# Patient Record
Sex: FEMALE
[De-identification: names, ages, dates, MRNs, and addresses within clinical notes are randomized per-mention and may not be internally consistent; named-entity substitution may affect disease eponyms.]

---

## 2021-02-17 ENCOUNTER — FORM ENCOUNTER (OUTPATIENT)
Age: 46
End: 2021-02-17

## 2023-03-06 PROBLEM — Z00.00 ENCOUNTER FOR PREVENTIVE HEALTH EXAMINATION: Status: ACTIVE | Noted: 2023-03-06

## 2023-03-14 ENCOUNTER — FORM ENCOUNTER (OUTPATIENT)
Age: 48
End: 2023-03-14

## 2023-03-27 ENCOUNTER — APPOINTMENT (OUTPATIENT)
Dept: BREAST CENTER | Facility: CLINIC | Age: 48
End: 2023-03-27

## 2023-04-10 ENCOUNTER — APPOINTMENT (OUTPATIENT)
Dept: BREAST CENTER | Facility: CLINIC | Age: 48
End: 2023-04-10
Payer: OTHER GOVERNMENT

## 2023-04-10 VITALS
OXYGEN SATURATION: 97 % | BODY MASS INDEX: 29.71 KG/M2 | HEART RATE: 69 BPM | SYSTOLIC BLOOD PRESSURE: 114 MMHG | DIASTOLIC BLOOD PRESSURE: 78 MMHG | HEIGHT: 64 IN | WEIGHT: 174 LBS

## 2023-04-10 DIAGNOSIS — Z78.9 OTHER SPECIFIED HEALTH STATUS: ICD-10-CM

## 2023-04-10 DIAGNOSIS — L74.8 OTHER ECCRINE SWEAT DISORDERS: ICD-10-CM

## 2023-04-10 DIAGNOSIS — R92.8 OTHER ABNORMAL AND INCONCLUSIVE FINDINGS ON DIAGNOSTIC IMAGING OF BREAST: ICD-10-CM

## 2023-04-10 PROCEDURE — 99204 OFFICE O/P NEW MOD 45 MIN: CPT

## 2023-04-10 RX ORDER — PREGABALIN 300 MG/1
CAPSULE ORAL
Refills: 0 | Status: ACTIVE | COMMUNITY

## 2023-04-10 RX ORDER — ZOLPIDEM TARTRATE 5 MG/1
5 TABLET ORAL
Refills: 0 | Status: ACTIVE | COMMUNITY

## 2023-04-10 NOTE — HISTORY OF PRESENT ILLNESS
[FreeTextEntry1] : 47 year old female was referred by Terrie Paz NP, who presents for initial evaluation regarding abnormal odor from right axilla, first noted by patient about 4 years ago. Denies any redness, discharge, palpable lumps at site of concern. Of note, patient reports increase in size of breasts since this time as well. Patient reports she has gained weight overall in this time, attributed to injuries, lifestyle and personal events. Denies use of HRT. \par \par B/L DX MMG/US 3/15/23 BIRADS-3 revealed (1) probably benign focal asymmetry of the LEFT breast with recommendation for  6 month follow-up diagnostic left mammogram and targeted left breast US; and (2) probably benign microcalcifications of the RIGHT upper outer breast,  not sufficiently changed back to 3/18/2022, with recommendation for continued diagnostic mammogram follow-up with right magnification views recommended in one year, to be performed at the time of screening; and (3) benign appearing fibrocystic changes in the RIGHT breast. \par \par Patient denies family history of breast cancer Denies famhx of ovarian cancer.\par Franci Lifetime Risk 14.3% \par

## 2023-04-10 NOTE — PHYSICAL EXAM
[No dominant masses] : no dominant masses in right breast  [No dominant masses] : no dominant masses left breast [de-identified] : UOQ density bilaterally

## 2023-04-10 NOTE — DATA REVIEWED
[FreeTextEntry1] : 3/15/22 (R) B/L sMMG: heterogeneously dense. \par -There are punctate calcifications in a clustered distribution in the UOQ of the right breast 4cmfn\par FOLLOW-UP: R diagnostic mammography to include magnification views is suggested. BI-RADS 0:  Incomplete. Need additional imaging evaluation. \par \par 3/18/23 (R) R DX MMG: heterogeneously dense.\par -There are loosely grouped punctate calcifications in the UOQ of the right breast, 4 to 5 cmfn. \par -A significant number of the calcifications layer on 90 degrees ML view supportive of benign milk of calcium.\par IMPRESSION: Probably benign right breast calcifications. FOLLOW-UP: Six-month follow-up diagnostic mammogram with magnification views. BI-RADS 3:  Probably benign. \par \par 10/14/22 (R) R DX MMG: heterogeneously dense. There are stable loosely grouped calcifications in the right upper outer breast. These appear smudgy on the cc view and the majority layer on 90 degrees magnification views. Majority are compatible with benign milk of calcium.\par IMPRESSION: Stable probably benign right breast calcifications.  \par FOLLOW-UP: Follow-up imaging in 5 months. Recommend additional short-term follow-up magnification mammography at time of bilateral mammography in 3/23.   \par BI-RADS 3:  Probably benign. \par \par 3/15/23 (R) B/L DX MMG/US: heterogeneously dense. \par 1. Probably benign focal asymmetry left central lower central to inner breast middle to posterior thirds. Recommend 6 month follow-up diagnostic left mammogram and targeted left breast ultrasound.\par 2. Probably benign microcalcifications right upper outer breast middle third, not sufficiently changed back to 3/18/2022 magnification views. Continued diagnostic mammogram follow-up with right magnification views recommended in one year, to be performed at the time of screening.\par 3. No sonographic findings of malignancy, with benign appearing fibrocystic changes in the right breast. \par FOLLOW-UP: Follow-up imaging in 6 months.\par BI-RADS 3:  Probably benign.

## 2023-04-10 NOTE — ASSESSMENT
[FreeTextEntry1] : 47 year old female presents for initial evaluation regarding right axillary odor. Physical exam WNL, not concerning for breast infection or breast cancer; will refer to dermatologist via [facility]. \par \par Most recent imaging reviewed, B/L DX MMG/US 3/15/23 BIRADS-3 revealed (1) probably benign focal asymmetry of the LEFT breast with recommendation for  6 month follow-up diagnostic left mammogram and targeted left breast US; and (2) probably benign microcalcifications of the RIGHT upper outer breast,  not sufficiently changed back to 3/18/2022, with recommendation for continued diagnostic mammogram follow-up with right magnification views recommended in one year, to be performed at the time of screening; and (3) benign appearing fibrocystic changes in the RIGHT breast. \par \par Plan for diagnostic left mammogram and targeted left breast US and re-examination in Sept 2023. Patient verbalizes understanding and is in agreement with the plan.

## 2023-04-10 NOTE — PAST MEDICAL HISTORY
[Menarche Age ____] : age at menarche was [unfilled] [Definite ___ (Date)] : the last menstrual period was [unfilled] [Regular Cycle Intervals] : have been regular [Total Preg ___] : G[unfilled] [Live Births ___] : P[unfilled]  [AB Spont ___] : miscarriages: [unfilled]  [History of Hormone Replacement Treatment] : has no history of hormone replacement treatment [FreeTextEntry6] : no [FreeTextEntry7] : yes

## 2023-06-02 ENCOUNTER — APPOINTMENT (OUTPATIENT)
Dept: DERMATOLOGY | Facility: CLINIC | Age: 48
End: 2023-06-02

## 2023-09-11 ENCOUNTER — APPOINTMENT (OUTPATIENT)
Dept: BREAST CENTER | Facility: CLINIC | Age: 48
End: 2023-09-11